# Patient Record
Sex: FEMALE | Race: WHITE | NOT HISPANIC OR LATINO | Employment: STUDENT | URBAN - METROPOLITAN AREA
[De-identification: names, ages, dates, MRNs, and addresses within clinical notes are randomized per-mention and may not be internally consistent; named-entity substitution may affect disease eponyms.]

---

## 2021-12-22 ENCOUNTER — TELEPHONE (OUTPATIENT)
Dept: OPHTHALMOLOGY | Facility: CLINIC | Age: 26
End: 2021-12-22
Payer: COMMERCIAL

## 2021-12-22 NOTE — TELEPHONE ENCOUNTER
Scheduled in open new general time slot tomorrow with Dr. Wood    Pt aware of date/time/location/duration at [facility] and has main clinic number    Evangelist Cote RN 11:52 AM 12/22/21          Spoke to pt at 1048    Right eye vision dimmer than left eye after waking up for about an hour, then resolves for about one month    Pt notices large floater in right eye intermittently for past month.    No flashing noted    Pt wears contacts most of day and has visual change at times that self resolves-- blurs unless looking in certain gaze times one month    Offered appt with Retina provider next Tuesday    Pt going hoping for appt today/tomorrow as will be out of town by Friday for holidays and then set for marriage following week followed by diogenes.    Will review schedule options with providers.    Evangelist Cote RN 11:03 AM 12/22/21          [facility]    Phone Message    May a detailed message be left on voicemail: yes     Reason for Call: Other: Pt called and said her optometrist referred her to see an ophthalmologist for her floaters she sees every morning. Her right eye also cannot focus. Per protocol please call to schedule. Thank you.       Action Taken: Message routed to:  [facility] EYE    Travel Screening: Not Applicable

## 2021-12-23 ENCOUNTER — OFFICE VISIT (OUTPATIENT)
Dept: OPHTHALMOLOGY | Facility: CLINIC | Age: 26
End: 2021-12-23
Attending: OPHTHALMOLOGY
Payer: COMMERCIAL

## 2021-12-23 DIAGNOSIS — H53.10 SUBJECTIVE VISION DISTURBANCE, BILATERAL: Primary | ICD-10-CM

## 2021-12-23 DIAGNOSIS — H52.7 REFRACTIVE ERROR: ICD-10-CM

## 2021-12-23 PROCEDURE — 92134 CPTRZ OPH DX IMG PST SGM RTA: CPT | Performed by: STUDENT IN AN ORGANIZED HEALTH CARE EDUCATION/TRAINING PROGRAM

## 2021-12-23 PROCEDURE — 92133 CPTRZD OPH DX IMG PST SGM ON: CPT | Performed by: STUDENT IN AN ORGANIZED HEALTH CARE EDUCATION/TRAINING PROGRAM

## 2021-12-23 PROCEDURE — G0463 HOSPITAL OUTPT CLINIC VISIT: HCPCS | Mod: 25

## 2021-12-23 PROCEDURE — 99204 OFFICE O/P NEW MOD 45 MIN: CPT | Mod: GC | Performed by: STUDENT IN AN ORGANIZED HEALTH CARE EDUCATION/TRAINING PROGRAM

## 2021-12-23 PROCEDURE — 99207 OCT RETINA SPECTRALIS OU (BOTH EYE): CPT | Mod: 26 | Performed by: STUDENT IN AN ORGANIZED HEALTH CARE EDUCATION/TRAINING PROGRAM

## 2021-12-23 ASSESSMENT — CUP TO DISC RATIO
OD_RATIO: 0.3
OS_RATIO: 0.3

## 2021-12-23 ASSESSMENT — VISUAL ACUITY
CORRECTION_TYPE: CONTACTS
OS_CC: 20/25
OD_CC: 20/20
METHOD: SNELLEN - LINEAR

## 2021-12-23 ASSESSMENT — CONF VISUAL FIELD
OS_NORMAL: 1
OD_NORMAL: 1

## 2021-12-23 ASSESSMENT — EXTERNAL EXAM - RIGHT EYE: OD_EXAM: NORMAL

## 2021-12-23 ASSESSMENT — EXTERNAL EXAM - LEFT EYE: OS_EXAM: NORMAL

## 2021-12-23 ASSESSMENT — TONOMETRY
IOP_METHOD: TONOPEN
OS_IOP_MMHG: 17
OD_IOP_MMHG: 18

## 2021-12-23 ASSESSMENT — SLIT LAMP EXAM - LIDS
COMMENTS: NORMAL
COMMENTS: NORMAL

## 2021-12-23 NOTE — NURSING NOTE
Chief Complaints and History of Present Illnesses   Patient presents with     New Patient     Chief Complaint(s) and History of Present Illness(es)     New Patient               Comments     Pt presents herself for a new patient appt. Pt states that she has had visual defects in her right eye- she says that when she wakes up her vision is dark and foggy for about an hour, and that her central vision will become blurry intermittently, but that she can look around in her vision and that it will be clear. That issue lasts for hours, per pt. She also says that she has seen floaters, but that they come and go as well.    Pt denies flashes, double vision, or pain in either eye.  Felipe Cortez OT 10:43 AM December 23, 2021

## 2021-12-23 NOTE — PROGRESS NOTES
"  OPHTHALMOLOGY [facility] Patient Note    CC:  Intermittent blurry vision and darkness in right eye for 1 month sent from [facility]    HPI:  HPI     Pt presents herself for a new patient appt. Pt states that she has had visual defects in her right eye- she says that when she wakes up her vision is dark and foggy for about an hour, and that her central vision will become blurry intermittently, but that she can look around in her vision and that it will be clear. That issue lasts for hours, per pt. She also says that she has seen floaters, but that they come and go as well.    Pt denies flashes, double vision, or pain in either eye.  Felipe Cortez OT 10:43 AM December 23, 2021    Last edited by Felipe Cortez on 12/23/2021 10:43 AM. (History)        About a month ago started noticing that right eye vision was \"darker\" across entire visual field lasting about an hour and then resolving. Also in the last month has noticed blurry vision centrally only in the right eye which she only notices while reading and occurs multiple times per day often lasting hours. Wears contact lenses full time. Denies pain in either eye.     POHx  - CTL wearer: full time  - Glasses: yes, myopia    PMHx  History reviewed. No pertinent past medical history.   - Multivitamin    FH:  - Glaucoma: none  - AMD: none    SH:  - Lives: with   - Works: nurse anesthesia student  - EtOH: 1/week  - Tobacco: no  - Recreational drugs: none    Assessment & Plan:  Vision changes, right eye  - subjective complaint of morning darkness in right eye that resolves after an hour and intermittent blurriness centrally while reading and wearing contacts all day lasting hours  - Exam today c normal VA, IOP, Dilated fundus exam, OCT macula and RNFL normal   - Reassurance given, suspect most likely etiology is dry eye syndrome. Does not use any Artificial tears     Plan:  - Start AT QID each eye and follow up in 1 month if symptoms persist  - Return " precautions reviewed     Disposition:  Return if symptoms worsen or fail to improve.     Seen and discussed with Dr. Zohaib Carter.    Gino Wood MD  Resident Physician - PGY2  Department of Ophthalmology   [facility]    Attending Physician Attestation:  Complete documentation of historical and exam elements from today's encounter can be found in the full encounter summary report (not reduplicated in this progress note).  I personally obtained the chief complaint(s) and history of present illness.  I confirmed and edited as necessary the review of systems, past medical/surgical history, family history, social history, and examination findings as documented by others; and I examined the patient myself.  I personally reviewed the relevant tests, images, and reports as documented above.  I formulated and edited as necessary the assessment and plan and discussed the findings and management plan with the patient and family. Attending Physician Image/Tesing Attestation: I personally reviewed the ophthalmic test(s) associated with this encounter, agree with the interpretation(s) as documented by the resident/fellow, and have edited the corresponding report(s) as necessary.  . - Zohaib Carter MD

## 2022-02-06 ENCOUNTER — HEALTH MAINTENANCE LETTER (OUTPATIENT)
Age: 27
End: 2022-02-06

## 2022-10-03 ENCOUNTER — HEALTH MAINTENANCE LETTER (OUTPATIENT)
Age: 27
End: 2022-10-03

## 2023-02-12 ENCOUNTER — HEALTH MAINTENANCE LETTER (OUTPATIENT)
Age: 28
End: 2023-02-12

## 2024-03-09 ENCOUNTER — HEALTH MAINTENANCE LETTER (OUTPATIENT)
Age: 29
End: 2024-03-09